# Patient Record
(demographics unavailable — no encounter records)

---

## 2025-02-18 NOTE — IMAGING
[de-identified] : Examination of the left hand is as follows:  Inspection: no ecchymosis, no erythema, no laceration/abrasion, no deformity, no nail deformity, no atrophy. Swelling of the radial side of the wrist just proximal to the radial styloid.  Tenderness over radial styloid and first dorsal compartment ROM: pain with range of motion, but good active flexion and extension of all finger joints.  Testing: positive Finkelstein's. Neuro: motor and sensory function intact in radial, ulnar, and median nerve distribution, no focal motor deficits, light touch intact throughout, palpable radial pulse, good capillary refill in all fingers. G    xrays left wrist3 views taken today 2/18/25 in office- no acute changes

## 2025-02-18 NOTE — HISTORY OF PRESENT ILLNESS
[9] : 9 [8] : 8 [Dull/Aching] : dull/aching [Sharp] : sharp [Intermittent] : intermittent [Leisure] : leisure [Nothing helps with pain getting better] : Nothing helps with pain getting better [Standing] : standing [Walking] : walking [de-identified] : 02/18/2025: Patient is a RHD 58yr  F here for LT hand pain. States that pain in her LT hand started about 2-3 months ago. Patient is a  and holds stop signs up often. Pain is mostly at the base of her thumb and radiates to her elbow. Denies numbness/ tingling. Denies trauma. RHD  HTN, cholesterol amlodipine, atorvastatin NKDA [] : no [FreeTextEntry1] : LT hand

## 2025-02-18 NOTE — ASSESSMENT
[FreeTextEntry1] : We reviewed the anatomy of the dorsal extensor compartment and pathology of deQuervain's tenosynovitis.  We discussed the treatment options including splinting/nsaids, injection and surgery.  We discussed that too many injections may lead to weakening o the tendon/tendon rupture and the safety of two injections. After a discussion of the risks, benefits and alternatives along with the expectations, the patient was amenable to injection.  The patient understands that it may take 2-5 days to see a noticeable difference.  After sterile prep, injection was performed with 1mL of 1%lidocaine and 6mg of celestone at the first dorsal extensor compartment.  Sterile Band-Aid was applied.